# Patient Record
Sex: MALE | Race: OTHER | RURAL
[De-identification: names, ages, dates, MRNs, and addresses within clinical notes are randomized per-mention and may not be internally consistent; named-entity substitution may affect disease eponyms.]

---

## 2022-06-15 NOTE — PATIENT DISCUSSION
besivance drop in office, increase artificial tears to 1qh while awake for today.  RTO if pain returns.

## 2022-06-15 NOTE — PROCEDURE NOTE: CLINICAL
PROCEDURE NOTE: Removal of Conjunctival FB, Superficial #1 OD. Diagnosis: Dry Eye Syndrome. Anesthesia: Topical. Prep: Antibiotic Drops q 5min x 3. Prior to treatment, the risks/benefits/alternatives were discussed. The patient wished to proceed with procedure. Superficial conjunctival FB removed with forcep/needle. Globe and conjunctiva intact. Patient tolerated procedure well. There were no complications. Post procedure instructions given. Arabella Echevarria

## 2024-07-29 ENCOUNTER — CONSULTATION/EVALUATION (OUTPATIENT)
Dept: RURAL CLINIC 3 | Facility: CLINIC | Age: 64
End: 2024-07-29

## 2024-07-29 DIAGNOSIS — H25.89: ICD-10-CM

## 2024-07-29 PROCEDURE — 92134 CPTRZ OPH DX IMG PST SGM RTA: CPT | Mod: NC

## 2024-07-29 PROCEDURE — 92136 OPHTHALMIC BIOMETRY: CPT

## 2024-07-29 PROCEDURE — 92025 CPTRIZED CORNEAL TOPOGRAPHY: CPT | Mod: NC

## 2024-07-29 PROCEDURE — 99204 OFFICE O/P NEW MOD 45 MIN: CPT

## 2024-07-29 ASSESSMENT — VISUAL ACUITY
OS_CC: 20/25
OS_AM: 20/20
OD_PAM: 20/20
OD_BAT: 20/100
OS_SC: 20/25
OS_BAT: 20/100
OD_SC: 20/30
OD_CC: 20/25

## 2024-07-29 ASSESSMENT — TONOMETRY
OS_IOP_MMHG: 15
OD_IOP_MMHG: 15

## 2024-09-18 ENCOUNTER — PRE-OP/H&P (OUTPATIENT)
Dept: RURAL CLINIC 3 | Facility: CLINIC | Age: 64
End: 2024-09-18

## 2024-09-18 VITALS
WEIGHT: 190 LBS | DIASTOLIC BLOOD PRESSURE: 72 MMHG | HEART RATE: 92 BPM | HEIGHT: 65 IN | SYSTOLIC BLOOD PRESSURE: 128 MMHG | BODY MASS INDEX: 31.65 KG/M2

## 2024-09-18 DIAGNOSIS — Z01.818: ICD-10-CM

## 2024-09-18 PROCEDURE — 99213 OFFICE O/P EST LOW 20 MIN: CPT

## 2024-10-17 ENCOUNTER — SURGERY/PROCEDURE (OUTPATIENT)
Dept: RURAL CLINIC 4 | Facility: CLINIC | Age: 64
End: 2024-10-17

## 2024-10-17 ENCOUNTER — POST OP/EVAL OF SECOND EYE (OUTPATIENT)
Dept: RURAL CLINIC 3 | Facility: CLINIC | Age: 64
End: 2024-10-17

## 2024-10-17 DIAGNOSIS — Z98.42: ICD-10-CM

## 2024-10-17 DIAGNOSIS — H25.89: ICD-10-CM

## 2024-10-17 PROCEDURE — 99024 POSTOP FOLLOW-UP VISIT: CPT

## 2024-10-17 PROCEDURE — 6698254 COMPLEX CATARACT (SX ONLY): Mod: 54,LT

## 2024-10-23 ENCOUNTER — PRE-OP/H&P (OUTPATIENT)
Dept: RURAL CLINIC 3 | Facility: CLINIC | Age: 64
End: 2024-10-23

## 2024-10-23 VITALS
BODY MASS INDEX: 31.65 KG/M2 | WEIGHT: 190 LBS | HEART RATE: 93 BPM | SYSTOLIC BLOOD PRESSURE: 156 MMHG | DIASTOLIC BLOOD PRESSURE: 88 MMHG | HEIGHT: 65 IN

## 2024-10-23 DIAGNOSIS — Z01.818: ICD-10-CM

## 2024-10-23 DIAGNOSIS — I10: ICD-10-CM

## 2024-10-23 DIAGNOSIS — F41.9: ICD-10-CM

## 2024-10-23 PROCEDURE — 99213 OFFICE O/P EST LOW 20 MIN: CPT | Mod: NC

## 2024-11-07 ENCOUNTER — SURGERY/PROCEDURE (OUTPATIENT)
Dept: RURAL CLINIC 4 | Facility: CLINIC | Age: 64
End: 2024-11-07

## 2024-11-07 ENCOUNTER — POST-OP (OUTPATIENT)
Dept: RURAL CLINIC 3 | Facility: CLINIC | Age: 64
End: 2024-11-07

## 2024-11-07 DIAGNOSIS — Z98.41: ICD-10-CM

## 2024-11-07 DIAGNOSIS — Z98.42: ICD-10-CM

## 2024-11-07 DIAGNOSIS — H25.89: ICD-10-CM

## 2024-11-07 PROCEDURE — 99024 POSTOP FOLLOW-UP VISIT: CPT

## 2024-11-07 PROCEDURE — 6698254 COMPLEX CATARACT (SX ONLY): Mod: 54,RT,79,RT
